# Patient Record
Sex: MALE | Race: OTHER | URBAN - METROPOLITAN AREA
[De-identification: names, ages, dates, MRNs, and addresses within clinical notes are randomized per-mention and may not be internally consistent; named-entity substitution may affect disease eponyms.]

---

## 2021-11-10 ENCOUNTER — APPOINTMENT (OUTPATIENT)
Dept: URBAN - METROPOLITAN AREA CLINIC 285 | Age: 66
Setting detail: DERMATOLOGY
End: 2021-11-15

## 2021-11-10 DIAGNOSIS — L40.0 PSORIASIS VULGARIS: ICD-10-CM

## 2021-11-10 PROCEDURE — 99204 OFFICE O/P NEW MOD 45 MIN: CPT

## 2021-11-10 PROCEDURE — OTHER COUNSELING: OTHER

## 2021-11-10 PROCEDURE — OTHER DIAGNOSIS COMMENT: OTHER

## 2021-11-10 PROCEDURE — OTHER MEDICATION COUNSELING: OTHER

## 2021-11-10 PROCEDURE — OTHER ADDITIONAL NOTES: OTHER

## 2021-11-10 PROCEDURE — OTHER PRESCRIPTION MEDICATION MANAGEMENT: OTHER

## 2021-11-10 PROCEDURE — OTHER MIPS QUALITY: OTHER

## 2021-11-10 PROCEDURE — OTHER ORDER TESTS: OTHER

## 2021-11-10 PROCEDURE — OTHER PRESCRIPTION: OTHER

## 2021-11-10 RX ORDER — FOLIC ACID 1 MG/1
TABLET ORAL QD
Qty: 24 | Refills: 2 | Status: ERX | COMMUNITY
Start: 2021-11-10

## 2021-11-10 RX ORDER — TRIAMCINOLONE ACETONIDE 1 MG/G
OINTMENT TOPICAL
Qty: 453.6 | Refills: 2 | Status: ERX | COMMUNITY
Start: 2021-11-10

## 2021-11-10 RX ORDER — METHOTREXATE SODIUM 2.5 MG/1
TABLET ORAL QWEEK
Qty: 24 | Refills: 2 | Status: ERX | COMMUNITY
Start: 2021-11-10

## 2021-11-10 ASSESSMENT — LOCATION ZONE DERM
LOCATION ZONE: TRUNK
LOCATION ZONE: ARM

## 2021-11-10 ASSESSMENT — LOCATION SIMPLE DESCRIPTION DERM
LOCATION SIMPLE: RIGHT FOREARM
LOCATION SIMPLE: LEFT UPPER ARM
LOCATION SIMPLE: CHEST
LOCATION SIMPLE: LEFT FOREARM
LOCATION SIMPLE: RIGHT UPPER ARM
LOCATION SIMPLE: ABDOMEN

## 2021-11-10 ASSESSMENT — LOCATION DETAILED DESCRIPTION DERM
LOCATION DETAILED: LEFT ANTERIOR LATERAL DISTAL UPPER ARM
LOCATION DETAILED: RIGHT DISTAL RADIAL DORSAL FOREARM
LOCATION DETAILED: LEFT MEDIAL SUPERIOR CHEST
LOCATION DETAILED: RIGHT ANTERIOR DISTAL UPPER ARM
LOCATION DETAILED: PERIUMBILICAL SKIN
LOCATION DETAILED: LEFT DISTAL DORSAL FOREARM

## 2021-11-10 NOTE — PROCEDURE: DIAGNOSIS COMMENT
Comment: Patient is visiting from [place]. For psoriasis, Usually, he’s been on methotrexate. He reports that that keeps it under good control.  Denies any history of tuberculosis or hepatitis. Denies recent rapid weight loss, fever chills, hemoptysis.  However, since he’s been visiting this [place] for several months he ran out of medication can I get a refill. Also, he tells me that the pharmacy he was going to in [place] is no longer carrying it. Will start them back on methotrexate. We’re going to get some labs done prior to signing off on vacation. Comment: Patient is visiting from [place]. For psoriasis, Usually, he’s been on methotrexate. He reports that that keeps it under good control.  Denies any history of tuberculosis or hepatitis. Denies recent rapid weight loss, fever chills, hemoptysis.  However, since he’s been visiting this [place] for several months he ran out of medication can I get a refill. Also, he tells me that the pharmacy he was going to in [place] is no longer carrying it. Will start them back on methotrexate. We’re going to get some labs done prior to signing off on vacation.

## 2021-11-10 NOTE — PROCEDURE: MEDICATION COUNSELING
No Propranolol Counseling:  I discussed with the patient the risks of propranolol including but not limited to low heart rate, low blood pressure, low blood sugar, restlessness and increased cold sensitivity. They should call the office if they experience any of these side effects.

## 2021-11-10 NOTE — PROCEDURE: MIPS QUALITY
Detail Level: Detailed
Quality 110: Preventive Care And Screening: Influenza Immunization: Influenza Immunization not Administered for Documented Reasons.
Quality 431: Preventive Care And Screening: Unhealthy Alcohol Use - Screening: Patient not identified as an unhealthy alcohol user when screened for unhealthy alcohol use using a systematic screening method
Quality 226: Preventive Care And Screening: Tobacco Use: Screening And Cessation Intervention: Patient screened for tobacco use and is an ex/non-smoker

## 2021-11-10 NOTE — PROCEDURE: PRESCRIPTION MEDICATION MANAGEMENT
Plan: Once we receive lab work patient may start the methotrexate
Initiate Treatment: methotrexate sodium 2.5 mg tablet QWEEK. Take six pills (15mg) once weekly.\\n\\nfolic acid 1 mg tablet. Take one pill once daily (except on the day you take your methotrexate).\\n\\ntriamcinolone acetonide 0.1 % topical ointment. Apply to affected areas on body throughout twice daily on weekdays, stop on weekends.
Render In Strict Bullet Format?: Yes
Detail Level: Zone

## 2021-11-10 NOTE — PROCEDURE: ADDITIONAL NOTES
Additional Notes: Denies coughing, weight loss, and night sweats. Patient has tolerated medication in the past.. patient ran out due to visiting from their home country. Patient denies alcohol use.
Detail Level: Simple
Render Risk Assessment In Note?: no

## 2021-11-10 NOTE — HPI: RASH
What Type Of Note Output Would You Prefer (Optional)?: Standard Output
Is The Patient Presenting As Previously Scheduled?: No, they are coming in before their scheduled appointment
How Severe Is Your Rash?: moderate
Is This A New Presentation, Or A Follow-Up?: Rash
Additional History: Patient was using metotrexato for rash

## 2021-11-10 NOTE — PROCEDURE: MEDICATION COUNSELING
[Negative] : Heme/Lymph Dapsone Pregnancy And Lactation Text: This medication is Pregnancy Category C and is not considered safe during pregnancy or breast feeding.

## 2021-11-10 NOTE — PROCEDURE: MEDICATION COUNSELING
Xelariannaz Pregnancy And Lactation Text: This medication is Pregnancy Category D and is not considered safe during pregnancy.  The risk during breast feeding is also uncertain.

## 2022-09-21 ENCOUNTER — APPOINTMENT (OUTPATIENT)
Dept: URBAN - METROPOLITAN AREA CLINIC 285 | Age: 67
Setting detail: DERMATOLOGY
End: 2022-09-21

## 2022-09-21 DIAGNOSIS — L40.0 PSORIASIS VULGARIS: ICD-10-CM

## 2022-09-21 PROCEDURE — OTHER MIPS QUALITY: OTHER

## 2022-09-21 PROCEDURE — OTHER COUNSELING: OTHER

## 2022-09-21 PROCEDURE — 99214 OFFICE O/P EST MOD 30 MIN: CPT

## 2022-09-21 PROCEDURE — OTHER PRESCRIPTION: OTHER

## 2022-09-21 PROCEDURE — OTHER TREATMENT REGIMEN: OTHER

## 2022-09-21 RX ORDER — TRIAMCINOLONE ACETONIDE 1 MG/G
CREAM TOPICAL
Qty: 453.6 | Refills: 0 | Status: ERX | COMMUNITY
Start: 2022-09-21

## 2022-09-21 ASSESSMENT — LOCATION DETAILED DESCRIPTION DERM
LOCATION DETAILED: LEFT DISTAL DORSAL FOREARM
LOCATION DETAILED: LEFT MEDIAL SUPERIOR CHEST
LOCATION DETAILED: PERIUMBILICAL SKIN
LOCATION DETAILED: RIGHT DISTAL RADIAL DORSAL FOREARM
LOCATION DETAILED: LEFT ANTERIOR LATERAL DISTAL UPPER ARM
LOCATION DETAILED: RIGHT ANTERIOR DISTAL UPPER ARM

## 2022-09-21 ASSESSMENT — LOCATION SIMPLE DESCRIPTION DERM
LOCATION SIMPLE: CHEST
LOCATION SIMPLE: RIGHT UPPER ARM
LOCATION SIMPLE: RIGHT FOREARM
LOCATION SIMPLE: LEFT UPPER ARM
LOCATION SIMPLE: LEFT FOREARM
LOCATION SIMPLE: ABDOMEN

## 2022-09-21 ASSESSMENT — BSA PSORIASIS: % BODY COVERED IN PSORIASIS: 10

## 2022-09-21 ASSESSMENT — LOCATION ZONE DERM
LOCATION ZONE: ARM
LOCATION ZONE: TRUNK

## 2022-09-21 ASSESSMENT — PGA PSORIASIS: PGA PSORIASIS 2020: MODERATE

## 2022-09-21 NOTE — PROCEDURE: TREATMENT REGIMEN
Plan: Pt is here with wife and daughter, visiting from [place] where he lives.  His daughter lives here and acted as a .  They wanted a year supply of MTX as it works well for him.  He has been out for months and his psoriasis came back.  He does not have a PCP, routine lab monitoring, or health care.  He does not have insurance.  I discussed treatment options with them but since he only visits a few months and is buying MTX on his own in [place] (they can no longer buy it there), I did not feel comfortable rxing any high risk medication without proper f/u and monitoring.  They opted to try the topicals but I made them aware they will have to seek care in [place] for refills.  They verbalized understanding.
Initiate Treatment: triamcinolone acetonide 0.1 % topical cream; ATAA on the body twice daily on trunk and extremities two weeks on two weeks off
Detail Level: Zone